# Patient Record
Sex: FEMALE | Race: OTHER | HISPANIC OR LATINO
[De-identification: names, ages, dates, MRNs, and addresses within clinical notes are randomized per-mention and may not be internally consistent; named-entity substitution may affect disease eponyms.]

---

## 2019-11-13 ENCOUNTER — APPOINTMENT (OUTPATIENT)
Dept: OBGYN | Facility: CLINIC | Age: 44
End: 2019-11-13
Payer: COMMERCIAL

## 2019-11-13 VITALS — BODY MASS INDEX: 43.77 KG/M2 | WEIGHT: 247 LBS | HEIGHT: 63 IN

## 2019-11-13 DIAGNOSIS — Z01.419 ENCOUNTER FOR GYNECOLOGICAL EXAMINATION (GENERAL) (ROUTINE) W/OUT ABNORMAL FINDINGS: ICD-10-CM

## 2019-11-13 PROCEDURE — 36415 COLL VENOUS BLD VENIPUNCTURE: CPT

## 2019-11-13 PROCEDURE — 99386 PREV VISIT NEW AGE 40-64: CPT

## 2019-11-13 NOTE — HISTORY OF PRESENT ILLNESS
[Last Pap ___] : Last cervical pap smear was [unfilled] [Reproductive Age] : is of reproductive age [Pregnancy History] : pregnancy history: [Total Preg ___] : [unfilled] [AB Induced ___] : elective abortions: [unfilled] [Sexually Active] : is sexually active [AB Spont ___] : miscarriages: [unfilled] [Definite:  ___ (Date)] : the last menstrual period was [unfilled]

## 2019-11-13 NOTE — PHYSICAL EXAM
[Awake] : awake [Alert] : alert [Acute Distress] : no acute distress [Mass] : no breast mass [Axillary LAD] : no axillary lymphadenopathy [Nipple Discharge] : no nipple discharge [Tender] : non tender [Soft] : soft [Oriented x3] : oriented to person, place, and time [Normal] : cervix [No Bleeding] : there was no active vaginal bleeding [Pap Obtained] : a Pap smear was performed [Uterine Adnexae] : were not tender and not enlarged

## 2019-11-18 LAB
CYTOLOGY CVX/VAG DOC THIN PREP: NORMAL
ESTRADIOL SERPL-MCNC: 16 PG/ML
FIBRINOGEN AG PPP IA-MCNC: 430 MG/DL
FSH SERPL-MCNC: 43.1 IU/L
HCG SERPL-MCNC: 1 MIU/ML
HPV HIGH+LOW RISK DNA PNL CVX: NOT DETECTED
LH SERPL-ACNC: 26.8 IU/L
PROGEST SERPL-MCNC: 0.1 NG/ML
PROLACTIN SERPL-MCNC: 13 NG/ML
T3 SERPL-MCNC: 125 NG/DL
T3FREE SERPL-MCNC: 3.17 PG/ML
T4 FREE SERPL-MCNC: 1.3 NG/DL
T4 SERPL-MCNC: 8.2 UG/DL
TSH SERPL-ACNC: 1.95 UIU/ML

## 2019-11-25 ENCOUNTER — APPOINTMENT (OUTPATIENT)
Dept: OPHTHALMOLOGY | Facility: CLINIC | Age: 44
End: 2019-11-25
Payer: COMMERCIAL

## 2019-11-25 ENCOUNTER — NON-APPOINTMENT (OUTPATIENT)
Age: 44
End: 2019-11-25

## 2019-11-25 DIAGNOSIS — Z34.90 ENCOUNTER FOR SUPERVISION OF NORMAL PREGNANCY, UNSPECIFIED, UNSPECIFIED TRIMESTER: ICD-10-CM

## 2019-11-25 PROCEDURE — 92134 CPTRZ OPH DX IMG PST SGM RTA: CPT

## 2019-11-25 PROCEDURE — 92004 COMPRE OPH EXAM NEW PT 1/>: CPT

## 2019-12-18 PROBLEM — Z34.90 ENCOUNTER FOR PRENATAL CARE: Status: ACTIVE | Noted: 2019-12-18

## 2019-12-23 LAB — HCG SERPL-MCNC: <1 MIU/ML

## 2020-04-26 ENCOUNTER — MESSAGE (OUTPATIENT)
Age: 45
End: 2020-04-26

## 2020-05-07 LAB
SARS-COV-2 IGG SERPL IA-ACNC: 4.8 INDEX
SARS-COV-2 IGG SERPL QL IA: POSITIVE

## 2020-10-09 ENCOUNTER — OUTPATIENT (OUTPATIENT)
Dept: OUTPATIENT SERVICES | Facility: HOSPITAL | Age: 45
LOS: 1 days | End: 2020-10-09
Payer: COMMERCIAL

## 2020-10-09 PROCEDURE — A9503: CPT

## 2020-10-09 PROCEDURE — 78306 BONE IMAGING WHOLE BODY: CPT

## 2020-10-09 PROCEDURE — 78830 RP LOCLZJ TUM SPECT W/CT 1: CPT | Mod: 26

## 2020-10-09 PROCEDURE — 78830 RP LOCLZJ TUM SPECT W/CT 1: CPT

## 2020-10-09 PROCEDURE — 78306 BONE IMAGING WHOLE BODY: CPT | Mod: 26

## 2020-10-28 RX ORDER — DICLOFENAC SODIUM 100 MG/1
100 TABLET, FILM COATED, EXTENDED RELEASE ORAL
Qty: 90 | Refills: 0 | Status: ACTIVE | COMMUNITY
Start: 2019-11-04 | End: 1900-01-01

## 2020-12-21 PROBLEM — Z01.419 ENCOUNTER FOR ANNUAL ROUTINE GYNECOLOGICAL EXAMINATION: Status: RESOLVED | Noted: 2019-11-13 | Resolved: 2020-12-21

## 2021-01-21 ENCOUNTER — RX RENEWAL (OUTPATIENT)
Age: 46
End: 2021-01-21

## 2021-09-27 ENCOUNTER — APPOINTMENT (OUTPATIENT)
Dept: ENDOCRINOLOGY | Facility: CLINIC | Age: 46
End: 2021-09-27

## 2021-12-13 ENCOUNTER — APPOINTMENT (OUTPATIENT)
Dept: ORTHOPEDIC SURGERY | Facility: CLINIC | Age: 46
End: 2021-12-13
Payer: COMMERCIAL

## 2021-12-13 DIAGNOSIS — Z78.9 OTHER SPECIFIED HEALTH STATUS: ICD-10-CM

## 2021-12-13 DIAGNOSIS — M47.812 SPONDYLOSIS W/OUT MYELOPATHY OR RADICULOPATHY, CERVICAL REGION: ICD-10-CM

## 2021-12-13 DIAGNOSIS — Z86.79 PERSONAL HISTORY OF OTHER DISEASES OF THE CIRCULATORY SYSTEM: ICD-10-CM

## 2021-12-13 DIAGNOSIS — G43.909 MIGRAINE, UNSPECIFIED, NOT INTRACTABLE, W/OUT STATUS MIGRAINOSUS: ICD-10-CM

## 2021-12-13 DIAGNOSIS — Z82.61 FAMILY HISTORY OF ARTHRITIS: ICD-10-CM

## 2021-12-13 DIAGNOSIS — M47.814 SPONDYLOSIS W/OUT MYELOPATHY OR RADICULOPATHY, THORACIC REGION: ICD-10-CM

## 2021-12-13 DIAGNOSIS — Z80.9 FAMILY HISTORY OF MALIGNANT NEOPLASM, UNSPECIFIED: ICD-10-CM

## 2021-12-13 PROCEDURE — 99203 OFFICE O/P NEW LOW 30 MIN: CPT

## 2021-12-13 PROCEDURE — 73630 X-RAY EXAM OF FOOT: CPT | Mod: RT

## 2021-12-13 RX ORDER — DICLOFENAC SODIUM 50 MG/1
50 TABLET, DELAYED RELEASE ORAL
Refills: 0 | Status: ACTIVE | COMMUNITY

## 2021-12-13 RX ORDER — OXYCODONE HYDROCHLORIDE AND ACETAMINOPHEN 325; 5 MG/5ML; MG/5ML
5-325 SOLUTION ORAL
Refills: 0 | Status: ACTIVE | COMMUNITY

## 2021-12-13 RX ORDER — SODIUM SULFATE, POTASSIUM SULFATE, MAGNESIUM SULFATE 17.5; 3.13; 1.6 G/ML; G/ML; G/ML
17.5-3.13-1.6 SOLUTION, CONCENTRATE ORAL
Qty: 354 | Refills: 0 | Status: COMPLETED | COMMUNITY
Start: 2021-08-30

## 2021-12-13 RX ORDER — BUTALBITAL, ACETAMINOPHEN AND CAFFEINE 300; 50; 40 MG/1; MG/1; MG/1
50-300-40 CAPSULE ORAL
Qty: 8 | Refills: 0 | Status: ACTIVE | COMMUNITY
Start: 2021-09-29

## 2021-12-13 RX ORDER — CLONAZEPAM 0.25 MG/1
0.25 TABLET, ORALLY DISINTEGRATING ORAL
Qty: 60 | Refills: 0 | Status: ACTIVE | COMMUNITY
Start: 2021-10-01

## 2021-12-13 RX ORDER — METRONIDAZOLE 500 MG/1
500 TABLET ORAL
Qty: 28 | Refills: 0 | Status: COMPLETED | COMMUNITY
Start: 2021-08-17

## 2021-12-13 RX ORDER — DOXYCYCLINE HYCLATE 100 MG/1
100 CAPSULE ORAL
Qty: 28 | Refills: 0 | Status: COMPLETED | COMMUNITY
Start: 2021-08-17

## 2021-12-13 RX ORDER — RIZATRIPTAN BENZOATE 10 MG/1
10 TABLET ORAL
Qty: 9 | Refills: 0 | Status: ACTIVE | COMMUNITY
Start: 2021-11-05

## 2021-12-13 RX ORDER — CLONAZEPAM 0.5 MG/1
0.5 TABLET, ORALLY DISINTEGRATING ORAL
Qty: 90 | Refills: 0 | Status: COMPLETED | COMMUNITY
Start: 2021-11-05

## 2021-12-13 RX ORDER — TIZANIDINE HYDROCHLORIDE 6 MG/1
6 CAPSULE ORAL
Refills: 0 | Status: ACTIVE | COMMUNITY

## 2021-12-13 RX ORDER — METFORMIN ER 500 MG 500 MG/1
500 TABLET ORAL
Qty: 30 | Refills: 0 | Status: COMPLETED | COMMUNITY
Start: 2021-09-23

## 2021-12-13 RX ORDER — DICLOFENAC POTASSIUM 50 MG/1
50 TABLET, COATED ORAL
Qty: 42 | Refills: 0 | Status: COMPLETED | COMMUNITY
Start: 2021-04-26

## 2021-12-13 NOTE — PROCEDURE
[de-identified] : Fitted for a short pneumatic walking boot in which she had much less pain when walking

## 2021-12-13 NOTE — PHYSICAL EXAM
[LE] : Sensory: Intact in bilateral lower extremities [DP] : dorsalis pedis 2+ and symmetric bilaterally [PT] : posterior tibial 2+ and symmetric bilaterally [Normal RLE] : Right Lower Extremity: No scars, rashes, lesions, ulcers, skin intact [Normal LLE] : Left Lower Extremity: No scars, rashes, lesions, ulcers, skin intact [Normal Touch] : sensation intact for touch [Normal] : Oriented to person, place, and time, insight and judgement were intact and the affect was normal [de-identified] : Right foot and ankle\par Mildly antalgic gait.  She can walk on her heels and toes but with some lateral foot pain.\par Point tenderness over the cuboid and the peroneal tendons overlying the cuboid.\par Minimal edema.  No ecchymoses or erythema.\par Ankle range of motion is within normal limits with about 7 or 8 degrees dorsiflexion and 35 to 40 degrees plantarflexion.  Normal subtalar motion with mild pain on inversion.\par 5/5 anterior tibial tendon, gastrocsoleus, peroneals, posterior tibial tendon, EHL.\par Sensation is intact.  Foot is warm with normal capillary refill. [de-identified] : \par X-rays of the right foot and ankle weightbearing 5 views taken today show what appears to be a transverse fracture through os peroneum.  Otherwise unremarkable

## 2021-12-13 NOTE — ASSESSMENT
[FreeTextEntry1] : 46-year-old woman with painful os perineum and possible stress fracture of the cuboid.  I referred her for an MRI to confirm whether or not this is an acute fracture.  In the meantime she will wear the walking boot.  Warm soaks and ice.  Cane if needed.  Limit walking to just what she needs to do.  Follow-up in 3 weeks but I will call her with the MRI results sooner.

## 2021-12-13 NOTE — REVIEW OF SYSTEMS
[Joint Pain] : joint pain [Joint Swelling] : joint swelling [Constipation] : constipation [Headache] : headache [Anxiety] : anxiety [Negative] : Heme/Lymph

## 2021-12-13 NOTE — HISTORY OF PRESENT ILLNESS
[de-identified] : Ms Santos is a 47 yo woman who comes in with pain in her right foot that started about 2 weeks ago.  She has pain in the lateral side of her foot that started without any injury.  Pain is intermittent and achy and cramping better with ice and worse walking particularly down stairs.  She has not had any treatment.  She may walk about 10 to 12 minutes a day with her commute.\par She does take occasional diclofenac for menstrual cramps and Percocet for back pain but does not take anything regularly and has not been taking it for this.  No prior foot issues.

## 2021-12-14 ENCOUNTER — OUTPATIENT (OUTPATIENT)
Dept: OUTPATIENT SERVICES | Facility: HOSPITAL | Age: 46
LOS: 1 days | End: 2021-12-14
Payer: COMMERCIAL

## 2021-12-14 ENCOUNTER — RESULT REVIEW (OUTPATIENT)
Age: 46
End: 2021-12-14

## 2021-12-14 ENCOUNTER — APPOINTMENT (OUTPATIENT)
Dept: ORTHOPEDIC SURGERY | Facility: AMBULATORY SURGERY CENTER | Age: 46
End: 2021-12-14
Payer: COMMERCIAL

## 2021-12-14 ENCOUNTER — APPOINTMENT (OUTPATIENT)
Dept: ULTRASOUND IMAGING | Facility: HOSPITAL | Age: 46
End: 2021-12-14

## 2021-12-14 PROCEDURE — 93971 EXTREMITY STUDY: CPT

## 2021-12-14 PROCEDURE — 93971 EXTREMITY STUDY: CPT | Mod: 26,RT

## 2021-12-14 PROCEDURE — 99214 OFFICE O/P EST MOD 30 MIN: CPT

## 2021-12-14 NOTE — HISTORY OF PRESENT ILLNESS
[de-identified] : Ms Santos comes in for f/u with pain in her right foot and now severe pain in the right lateral to posterior calf.  She felt some discomfort in the medial calf yesterday from the boot but nothing lateral or posterior.  She went to bed and then this morning she had more severe pain in the posterior lateral lower leg diffusely up to the upper calf but not behind the knee.  No significant swelling.  No numbness or tingling.  No history of DVT in the past.  She did not wear the boot all night when she was home and had just worn it in the day yesterday after I gave it to her.  The foot feels better walking in the boot but still some mild pain.\par She walked in urgently given the pain the today at work.

## 2021-12-14 NOTE — PHYSICAL EXAM
[de-identified] : Right foot and ankle\par She has less pain in the foot walking in the boot.  Out of the boot she walks with an antalgic gait.  She has pain in the lateral calf when walking in or out of the boot.  The boot does not seem to be creating any pressure on the mid lateral leg although her calf does get wider more proximally and there was concern that perhaps there was a pressure point.  Skin appears normal without any obvious pressure points.\par Point tenderness over the cuboid and the peroneal tendons overlying the cuboid.\par Some general tenderness through the lateral calf but no severe point tenderness.  Nontender in the popliteal hiatus.  No palpable cords.  No visible phlebitis.\par Negative Cece.\par Minimal edema.  No ecchymoses or erythema.\par Ankle range of motion is within normal limits with about 7 or 8 degrees dorsiflexion and 35 to 40 degrees plantarflexion.  Normal subtalar motion with mild pain on inversion.\par 5/5 anterior tibial tendon, gastrocsoleus, peroneals, posterior tibial tendon, EHL.\par Sensation is intact.  Foot is warm with normal capillary refill. [de-identified] : \par X-rays of the right foot and ankle weightbearing 5 views taken 12/13/21 showed what appears to be a transverse fracture through os peroneum.  Otherwise unremarkable

## 2021-12-14 NOTE — ASSESSMENT
[FreeTextEntry1] : 46-year-old woman with painful os peroneum and possible stress fracture of the cuboid.  She has pain now throughout the right posterior lateral calf up to almost the knee.  I referred her for Doppler ultrasound to rule out DVT which she should get done as soon as possible.  She also needs to schedule an MRI of the ankle to assess the os perineum and cuboid for stress fractures.\par She was given a cane.  She should make sure the boots not cutting into the leg or causing any pressure.  She will take it off when sitting and lying down.  I will speak to her after I get the ultrasound results.  She could not take an aspirin 1/day in the meantime and if there is a DVT she will need anticoagulation.\par We called the hospital and they will squeeze her in today for the Doppler ultrasound.\par She was given a cane to assist in ambulation get more pressure off the foot.  When she is lying down she can take the boot off for if she is sitting it should be loose around her calf..\par The other possibility is L5 radiculopathy   Or perhaps just pain radiating along the peroneal tendons.

## 2021-12-17 ENCOUNTER — NON-APPOINTMENT (OUTPATIENT)
Age: 46
End: 2021-12-17

## 2022-01-03 ENCOUNTER — APPOINTMENT (OUTPATIENT)
Dept: ORTHOPEDIC SURGERY | Facility: CLINIC | Age: 47
End: 2022-01-03
Payer: COMMERCIAL

## 2022-01-03 ENCOUNTER — NON-APPOINTMENT (OUTPATIENT)
Age: 47
End: 2022-01-03

## 2022-01-03 DIAGNOSIS — M79.661 PAIN IN RIGHT LOWER LEG: ICD-10-CM

## 2022-01-03 DIAGNOSIS — M84.374A STRESS FRACTURE, RIGHT FOOT, INITIAL ENCOUNTER FOR FRACTURE: ICD-10-CM

## 2022-01-03 PROCEDURE — 99214 OFFICE O/P EST MOD 30 MIN: CPT

## 2022-01-03 RX ORDER — CELECOXIB 200 MG/1
200 CAPSULE ORAL DAILY
Qty: 20 | Refills: 0 | Status: ACTIVE | COMMUNITY
Start: 2022-01-03 | End: 1900-01-01

## 2022-01-03 NOTE — ASSESSMENT
[FreeTextEntry1] : 46-year-old woman with painful os peroneum, peroneal tendinitis without any evidence of stress fracture in the os perineum or elsewhere in the hindfoot.  She is still having some lateral hindfoot pain mostly localized and does not seem consistent with sciatica or an L5 nerve root radiculopathy given the more localized nature of the pain and tenderness.\par Doppler US negative for DVT.\par MRI did not show any acute fracture but some edema in the lateral hindfoot around the peroneal tendons which appeared intact.  There is a multipartite os peroneum without edema to suggest a fracture or stress reaction.\par I recommended compression and warm soaks and ice.  She will try Celebrex 200 mg daily for a week or 2.\par She will try lateral heel wedge.  Wearing the boot seem to cause more issues so she will not use a walking boot but should wear good supportive shoe.\par Follow-up in about 2 to 3 weeks.

## 2022-01-03 NOTE — HISTORY OF PRESENT ILLNESS
[de-identified] : Ms Santos comes in for f/u with pain in her right foot and severe pain in the right lateral to posterior calf.  \par She rested at home a few days with partial improvement.\par She has been working for the past couple weeks mostly sitting at work.  If she stands or walks too long she still gets pain mostly in the lateral hindfoot to midfoot.  Occasionally there is some calf pain.  No significant back pain recently and no sciatica or shooting pain from the back.  No numbness or tingling.\par She has history of thoracic herniated disks.

## 2022-01-03 NOTE — PHYSICAL EXAM
[Slightly Antalgic] : slightly antalgic [de-identified] : Right foot and ankle\par Gait is mildly antalgic still favoring the right foot slightly.\par Tender more mildly over the lateral hindfoot around the lateral peroneal tendons to the base of fifth metatarsal\par Minimal tenderness through the lateral calf but no severe point tenderness.  Nontender in the popliteal hiatus.  No palpable cords.  No visible phlebitis.\par Negative Cece.\par Negative straight leg raise\par Minimal edema.  No ecchymoses or erythema.\par Ankle range of motion is within normal limits with about 7 or 8 degrees dorsiflexion and 35 to 40 degrees plantarflexion.  Normal subtalar motion with mild pain on inversion.\par 5/5 anterior tibial tendon, gastrocsoleus, peroneals, posterior tibial tendon, EHL.  Peroneal tendons feel intact\par Sensation is intact.  Foot is warm with normal capillary refill. [de-identified] : \par X-rays of the right foot and ankle weightbearing 5 views taken 12/13/21 showed what appears to be a transverse fracture through os peroneum.  Otherwise unremarkable\par \par MRI of the right foot on December 29, 2021 showed some mild soft tissue edema surrounding the peroneal tendons at the lateral aspect of the cuboid and base of fifth metatarsal but no fractures seen.  There is a multipartite os peroneum without edema to suggest a fracture.

## 2022-01-11 DIAGNOSIS — M54.31 SCIATICA, RIGHT SIDE: ICD-10-CM

## 2022-01-11 RX ORDER — METHYLPREDNISOLONE 4 MG/1
4 TABLET ORAL
Qty: 1 | Refills: 0 | Status: ACTIVE | COMMUNITY
Start: 2022-01-11 | End: 1900-01-01

## 2022-01-20 ENCOUNTER — APPOINTMENT (OUTPATIENT)
Dept: ORTHOPEDIC SURGERY | Facility: CLINIC | Age: 47
End: 2022-01-20
Payer: COMMERCIAL

## 2022-01-20 DIAGNOSIS — M79.671 PAIN IN RIGHT FOOT: ICD-10-CM

## 2022-01-20 DIAGNOSIS — M77.51 OTHER ENTHESOPATHY OF RT FOOT AND ANKLE: ICD-10-CM

## 2022-01-20 PROCEDURE — 99214 OFFICE O/P EST MOD 30 MIN: CPT

## 2022-01-20 PROCEDURE — 73630 X-RAY EXAM OF FOOT: CPT | Mod: RT

## 2022-01-20 NOTE — PHYSICAL EXAM
[LE] : Sensory: Intact in bilateral lower extremities [DP] : dorsalis pedis 2+ and symmetric bilaterally [PT] : posterior tibial 2+ and symmetric bilaterally [Normal RLE] : Right Lower Extremity: No scars, rashes, lesions, ulcers, skin intact [Normal LLE] : Left Lower Extremity: No scars, rashes, lesions, ulcers, skin intact [Normal Touch] : sensation intact for touch [Normal] : Gait: normal [de-identified] : Right foot and ankle\par Nonantalgic gait.  She can walk on her heels and toes with slight discomfort in the lateral foot but much better.\par Heel is neutral to slightly varus.  She supinates when walking\par Tender over the proximal fifth metatarsal today and not tender over the cuboid and distal peroneal tendons\par Negative Cece.\par Negative straight leg raise\par No edema.  No ecchymoses or erythema.\par Ankle range of motion is within normal limits with about 7 or 8 degrees dorsiflexion and 35 to 40 degrees plantarflexion.  Normal subtalar motion with mild pain on inversion.\par 5/5 anterior tibial tendon, gastrocsoleus, peroneals, posterior tibial tendon, EHL.  Peroneal tendons feel intact\par Sensation is intact.  Foot is warm with normal capillary refill. [de-identified] : \par X-rays of the right foot weightbearing 3 views today show some irregularity in the proximal fifth metatarsal metaphysis with sclerosis and lucency not definitively a stress fracture.  This was also visible to a slightly lesser extent on prior x-rays.\par There is a bipartite os perineum unchanged from last set of x-rays in December\par x-rays of the right foot and ankle weightbearing 5 views taken 12/13/21 showed what appears to be a transverse fracture through os peroneum.  Otherwise unremarkable\par \par MRI of the right foot on December 29, 2021 showed some mild soft tissue edema surrounding the peroneal tendons at the lateral aspect of the cuboid and base of fifth metatarsal but no fractures seen.  There is a multipartite os peroneum without edema to suggest a fracture.

## 2022-01-20 NOTE — ASSESSMENT
[FreeTextEntry1] : 46-year-old woman with painful os peroneum, peroneal tendinitis with possible stress reaction fifth metatarsal.  I would like an MRI of her foot to look at the fifth metatarsal to see if there is evidence of an evolving stress fracture.  There were some subtle changes on x-ray but not definitive for fracture.  She had been in the walking boot but this caused severe back pain which has now resolved.  The foot pain is partially better but not completely better.  If there is a fracture then we really need to protect it because otherwise it is likely to get worse and could need surgery.  Therefore the MRI is indicated to direct treatment at this time.\par I will call her with results.  In the meantime she should really take it easy and limit walking and standing or any aggravating activities.  Continue to wear very good supportive shoes.

## 2022-01-20 NOTE — HISTORY OF PRESENT ILLNESS
[de-identified] : Ms Santos comes in for f/u with pain in her right foot and pain in the right lateral to posterior calf.  \par She had ongoing pain that seemed c/w sciatica so a Medrol dosepak was prescribed which helped.\par She states that the foot pain has decreased.  She is wearing an orthotic and has a lateral heel wedge in her shoe which seems to be helping.  She has not been wearing the walking boot since it aggravated her back.  The back pain has resolved.  She now just gets intermittent pain in the lateral midfoot area that occurs if she stands for more than 15 minutes or is walking longer distances but is much less than it had been initially\par No swelling.

## 2022-01-28 ENCOUNTER — NON-APPOINTMENT (OUTPATIENT)
Age: 47
End: 2022-01-28

## 2022-03-07 ENCOUNTER — APPOINTMENT (OUTPATIENT)
Dept: ORTHOPEDIC SURGERY | Facility: CLINIC | Age: 47
End: 2022-03-07

## 2022-07-19 ENCOUNTER — APPOINTMENT (OUTPATIENT)
Dept: OPHTHALMOLOGY | Facility: CLINIC | Age: 47
End: 2022-07-19

## 2022-07-19 ENCOUNTER — NON-APPOINTMENT (OUTPATIENT)
Age: 47
End: 2022-07-19

## 2022-07-19 PROCEDURE — 92012 INTRM OPH EXAM EST PATIENT: CPT

## 2023-05-03 ENCOUNTER — NON-APPOINTMENT (OUTPATIENT)
Age: 48
End: 2023-05-03

## 2023-05-03 ENCOUNTER — APPOINTMENT (OUTPATIENT)
Dept: OPHTHALMOLOGY | Facility: CLINIC | Age: 48
End: 2023-05-03
Payer: COMMERCIAL

## 2023-05-03 PROCEDURE — 92012 INTRM OPH EXAM EST PATIENT: CPT

## 2023-07-10 ENCOUNTER — NON-APPOINTMENT (OUTPATIENT)
Age: 48
End: 2023-07-10

## 2023-07-10 ENCOUNTER — APPOINTMENT (OUTPATIENT)
Dept: OPHTHALMOLOGY | Facility: CLINIC | Age: 48
End: 2023-07-10
Payer: COMMERCIAL

## 2023-07-10 PROCEDURE — 92012 INTRM OPH EXAM EST PATIENT: CPT
